# Patient Record
Sex: FEMALE | Race: WHITE | NOT HISPANIC OR LATINO | Employment: OTHER | URBAN - METROPOLITAN AREA
[De-identification: names, ages, dates, MRNs, and addresses within clinical notes are randomized per-mention and may not be internally consistent; named-entity substitution may affect disease eponyms.]

---

## 2024-02-22 ENCOUNTER — OFFICE VISIT (OUTPATIENT)
Dept: NEUROSURGERY | Facility: CLINIC | Age: 74
End: 2024-02-22
Payer: COMMERCIAL

## 2024-02-22 VITALS
TEMPERATURE: 95.7 F | BODY MASS INDEX: 37.76 KG/M2 | HEIGHT: 61 IN | HEART RATE: 81 BPM | WEIGHT: 200 LBS | SYSTOLIC BLOOD PRESSURE: 130 MMHG | DIASTOLIC BLOOD PRESSURE: 72 MMHG

## 2024-02-22 DIAGNOSIS — G89.4 CHRONIC PAIN SYNDROME: ICD-10-CM

## 2024-02-22 DIAGNOSIS — G95.89 MYELOMALACIA OF CERVICAL CORD (MULTI): Primary | ICD-10-CM

## 2024-02-22 PROCEDURE — 1125F AMNT PAIN NOTED PAIN PRSNT: CPT | Performed by: NEUROLOGICAL SURGERY

## 2024-02-22 PROCEDURE — 1159F MED LIST DOCD IN RCRD: CPT | Performed by: NEUROLOGICAL SURGERY

## 2024-02-22 PROCEDURE — 1036F TOBACCO NON-USER: CPT | Performed by: NEUROLOGICAL SURGERY

## 2024-02-22 PROCEDURE — 99215 OFFICE O/P EST HI 40 MIN: CPT | Performed by: NEUROLOGICAL SURGERY

## 2024-02-22 RX ORDER — VIT C/E/ZN/COPPR/LUTEIN/ZEAXAN 250MG-90MG
6000 CAPSULE ORAL
COMMUNITY

## 2024-02-22 RX ORDER — PHENYLEPHRINE HCL 10 MG
1000 TABLET ORAL
COMMUNITY
Start: 2014-10-21

## 2024-02-22 RX ORDER — ACETAMINOPHEN 500 MG
TABLET ORAL EVERY 6 HOURS
COMMUNITY

## 2024-02-22 RX ORDER — LISINOPRIL AND HYDROCHLOROTHIAZIDE 12.5; 2 MG/1; MG/1
1 TABLET ORAL
COMMUNITY
Start: 2023-08-22 | End: 2024-08-21

## 2024-02-22 RX ORDER — MULTIVITAMIN
1 TABLET ORAL
COMMUNITY

## 2024-02-22 RX ORDER — FERROUS SULFATE TAB 325 MG (65 MG ELEMENTAL FE) 325 (65 FE) MG
1 TAB ORAL DAILY
COMMUNITY
Start: 2023-08-05 | End: 2023-09-04

## 2024-02-22 RX ORDER — TRAZODONE HYDROCHLORIDE 150 MG/1
150 TABLET ORAL
COMMUNITY
Start: 2024-01-08

## 2024-02-22 RX ORDER — GABAPENTIN 300 MG/1
300 CAPSULE ORAL 3 TIMES DAILY
COMMUNITY
End: 2024-04-17 | Stop reason: SDUPTHER

## 2024-02-22 RX ORDER — ALBUTEROL SULFATE 90 UG/1
AEROSOL, METERED RESPIRATORY (INHALATION)
COMMUNITY
Start: 2014-10-21

## 2024-02-22 ASSESSMENT — PAIN SCALES - GENERAL: PAINLEVEL: 8

## 2024-02-22 ASSESSMENT — PATIENT HEALTH QUESTIONNAIRE - PHQ9
2. FEELING DOWN, DEPRESSED OR HOPELESS: NOT AT ALL
1. LITTLE INTEREST OR PLEASURE IN DOING THINGS: NOT AT ALL
SUM OF ALL RESPONSES TO PHQ9 QUESTIONS 1 AND 2: 0

## 2024-02-22 ASSESSMENT — ENCOUNTER SYMPTOMS: OCCASIONAL FEELINGS OF UNSTEADINESS: 1

## 2024-02-22 NOTE — PROGRESS NOTES
[facility] [place]  Department of Neurological Surgery  New Patient Visit    History of Present Illness:  Milla Richardson is a 73 y.o. year old female who presents to the spine clinic with severe pain in her neck and down both arms into the thumb and index finger worse on the left than the right.  The symptoms have been present since before her cervical operation 8 years ago.  She had her first operation at the [facility].  She did not improve.  She was seen by my former partner Dr. Bai at [facility].  He asked me to help with the revision decompression.  He has had a new MRI.  Cannot tell if she is completely fused but I think the canal is decompressed.  I still see evidence of myelomalacia at C4-5.  Might take on the situation is that the patient injured her spinal cord and developed a pain syndrome.  She did not improve with the first operation but seems to have done a good job decompressing at the affected level.  She did not improve with the second operation which did decompress the worst level which was C6-7 at the time.  I am concerned that this is not going to improve with any further operations.  I told her I be more than happy to order a CAT scan of the area but I would wait until we were considering more surgery.  My primary recommendation is to see a physical medicine and rehabilitation specialist who might have a few pain management techniques but also can help with the overall management of the disabling condition.    Prior Spine Surgeries: The first surgery was C4-5, C5-6 and C6-7 interbody fusion and fixation; the second operation was takedown of the original plate reoperation at C6-7 and then a new anterior cervical disc excision with interbody fusion and fixation at C3-4 with a new plate construct from C3-C7.  It does look like she is fusing up.  The CAT scan would make that determination for sure.  The fact that the patient has never improved from either  operation and I can still see the myelomalacia being present bodes poorly for more surgery    Physical Therapy: Yes in the past  Diabetic: No  Osteoporosis: Unknown  Patient's BMI is Body mass index is 37.79 kg/m².    14/14 systems reviewed and negative other than what is listed in the history of present illness    Patient Active Problem List   Diagnosis    Myelomalacia of cervical cord (CMS/HCC)    Chronic pain syndrome     No past medical history on file.  No past surgical history on file.  Social History     Tobacco Use    Smoking status: Never    Smokeless tobacco: Never   Substance Use Topics    Alcohol use: Yes     Comment: Socially     family history is not on file.    Current Outpatient Medications:     acetaminophen (Tylenol) 500 mg tablet, every 6 hours., Disp: , Rfl:     albuterol 90 mcg/actuation inhaler, , Disp: , Rfl:     CALCIUM ORAL, Take 1 tablet by mouth once daily., Disp: , Rfl:     cholecalciferol (Vitamin D-3) 25 MCG (1000 UT) capsule, Take 6 capsules (150 mcg) by mouth once daily., Disp: , Rfl:     Cinnamon 500 mg capsule, Take 1,000 Units by mouth once daily., Disp: , Rfl:     gabapentin (Neurontin) 300 mg capsule, Take 1 capsule (300 mg) by mouth 3 times a day., Disp: , Rfl:     lisinopriL-hydrochlorothiazide 20-12.5 mg tablet, Take 1 tablet by mouth once daily., Disp: , Rfl:     multivitamin tablet, Take 1 tablet by mouth once daily., Disp: , Rfl:     traZODone (Desyrel) 150 mg tablet, Take 1 tablet (150 mg) by mouth., Disp: , Rfl:   Allergies   Allergen Reactions    Penicillins Itching and Rash       Physical Examination      General: NAD, she is very emotional today, AOx 3,  no aphasia or dysarthria, normal fund of knowledge  Cranial Nerves II-XII: VFF, PERRL, EOMI, Face Symm, Facial SILT, Palate/Tongue midline and symmetric  Motor: 5/5 Throughout all extremities,  No drift, no dysmetria on finger to nose  Sensation: SILT and PP throughout all extremities  DTRS: 2+ Throughout, No Hoffmans  or Clonus    Results    I personally reviewed and interpreted the imaging results which included the MRI of her cervical spine which is as described above.    Assessment and Plan:    Milla Richardson is a 73 y.o. year old female who presents to the spine clinic with the chronic pain syndrome with evidence of myelomalacia.  No improvement of her symptoms after 2 separate cervical operations.  I think she should get enrolled with a physical medicine and rehabilitation specialist to try to maximize her function.  I told her that I be happy to order a CAT scan and talk about the situation further but I am trying to discourage her from thinking there is anything that will make her better that is surgical..      I have reviewed all prior documentation and reviewed the electronic medical record since admission. I have personally have reviewed all advanced imaging not just the reports and used my interpretation as documented as the relevant findings. I have reviewed the risks and benefits of all treatment recommendations listed in this note with the patient and family. I spent a total of 60 minutes in service to this patient's care during this date of service.      The above clinical summary has been dictated with voice recognition software. It has not been proofread for grammatical errors, typographical mistakes, or other semantic inconsistencies.    Thank you for visiting our office today. It was our pleasure to take part in your healthcare.     Do not hesitate to call with any questions regarding your plan of care after leaving. My office can be reached at [phone] M-Q 8am-4pm.     To clinicians, thank you very much for this kind referral. It is a privilege to partner with you in the care of your patients. My office would be delighted to assist you with any further consultations or with questions regarding the plan of care outlined. Do not hesitate to call the office or contact me directly.     Sincerely,    Ben  KAYLIN Smith MD, FAANS, FACS  Board Certified Neurological Surgeon  , Department of Neurological Surgery  [facility]    [facility]  [address]  Medical [facility] [address]  [address]  [address]  [address]  [place] [address]    Phone: [phone]  Fax: [phone]

## 2024-03-07 ENCOUNTER — CONSULT (OUTPATIENT)
Dept: ORTHOPEDIC SURGERY | Facility: CLINIC | Age: 74
End: 2024-03-07
Payer: COMMERCIAL

## 2024-03-07 DIAGNOSIS — M54.12 CERVICAL RADICULITIS: Primary | ICD-10-CM

## 2024-03-07 DIAGNOSIS — G56.02 CARPAL TUNNEL SYNDROME, LEFT: ICD-10-CM

## 2024-03-07 DIAGNOSIS — M79.18 MYOFASCIAL PAIN: ICD-10-CM

## 2024-03-07 DIAGNOSIS — G95.89 MYELOMALACIA OF CERVICAL CORD (MULTI): ICD-10-CM

## 2024-03-07 DIAGNOSIS — G58.7 DOUBLE CRUSH SYNDROME: ICD-10-CM

## 2024-03-07 PROCEDURE — 99204 OFFICE O/P NEW MOD 45 MIN: CPT | Performed by: PHYSICAL MEDICINE & REHABILITATION

## 2024-03-07 RX ORDER — DULOXETIN HYDROCHLORIDE 20 MG/1
CAPSULE, DELAYED RELEASE ORAL
Qty: 90 CAPSULE | Refills: 2 | Status: SHIPPED | OUTPATIENT
Start: 2024-03-07

## 2024-03-07 NOTE — PROGRESS NOTES
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  Status post C3-7 revision fusion surgery with residual left C4 and bilateral C5 foraminal stenosis  Also status post bilateral carpal tunnel syndrome in the 1980s with revision in 2015 on the left  EMG suggested left ulnar neuropathy but neuromuscular ultrasound from [facility] shows bilateral median nerve enlargement and borderline left/significant right ulnar nerve enlargement at the elbow.    RECOMMENDATIONS:  -Personally interpreted MRI cervical images from [facility] with report, agree per above  -Extensive record review of [facility] including ultrasound report per above  -Increase gabapentin to 900 mg 3 times daily  -Start duloxetine 20 mg twice daily  -Will need to discuss anti-inflammatories, possibly topical diclofenac or compounded formulations at follow-up  -f/u with Dr. Gaby Marquis D.O. and myself for ultrasound-guided left carpal tunnel injection.  Her previous injections were not ultrasound-guided and she says they were done by an assistant and Dr. Barragan's office.  If she does get even temporary relief with the carpal tunnel injection I will refer her to one of our hand surgeons here at her request  -If she does not get significant relief with carpal tunnel injection we will probably have Dr. Mccracken do diagnostic left C4 and C6 selective nerve root blocks  -She also needs to try therapy again for her cervical spine, but I would prefer to get her a little better pain control and narrow down the diagnosis first    Diagnoses and plan discussed with the patient, patient educated on above diagnoses and treatments, including alternatives     Renato Redding MD, FAAPMR, R-MSK  Chief, Division of PM&R  Board Certified in PM&R and Sports Medicine    [place] copy : JEFFERSON WATSON  "only  ____________________________________________________________________    3/7/2024    CC: Patient complains of neck pain and left greater than right upper extremity pain that is when he finished the    HPI:    Seen at the request of Dr. Ben Smith from neurosurgery for chronic neck and left greater than right upper extremity pain.   Has h/o b/l total knee arthroplasty and Lumbar Laminectomy 2020 ([facility]), Carpal tunnel surgery in 1980s followed by revision CT release 2015.   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  Had concussion and neck pain , then developed Left UE coldness and  eventually pain shooting more to Left UE.   I summarized the history from Dr. Smith from earlier this month as follows and confirmed the patient:    \"severe pain in her neck and down both arms into the thumb and index finger worse on the left than the right.  The symptoms have been present since before her cervical operation 8 years ago.  She had her first operation at the [facility].  She did not improve.  She was seen by my former partner Dr. Bai at [facility].  He asked me to help with the revision decompression.  He has had a new MRI.  Cannot tell if she is completely fused but I think the canal is decompressed.  I still see evidence of myelomalacia at C4-5.  Might take on the situation is that the patient injured her spinal cord and developed a pain syndrome.  She did not improve with the first operation but seems to have done a good job decompressing at the affected level.  She did not improve with the second operation which did decompress the worst level which was C6-7 at the time.  I am concerned that this is not going to improve with any further operations.  I told her I be more than happy to order a CAT scan of the area but I would wait until we were considering more surgery.  My primary recommendation is to see a physical medicine and rehabilitation specialist who might " "have a few pain management techniques but also can help with the overall management of the disabling condition.\"     Prior Spine Surgeries: The first surgery was C4-5, C5-6 and C6-7 interbody fusion and fixation at [facility] 2020; the second operation was June 2021 but \"failed\" pt and spinal cord was \"shutting down\"  Then had takedown of the original plate reoperation at C6-7 and then a new anterior cervical disc excision with interbody fusion and fixation at C3-4 with a new plate construct from C3-C7 at [facility] Dec 2021 with Dr Smith.  Per Dr Smith It does look like she is fusing up.  The CAT scan would make that determination for sure.  The fact that the patient has never improved from either operation and I can still see the myelomalacia being present bodes poorly for more surgery     Physical Therapy: Yes in the past  Diabetic: No  Osteoporosis: Unknown  Patient's BMI is Body mass index is 37.79 kg/m².   .    The pain increases with and is relieved by.    Pain is severe, about 8 out of 10 at most severe and 6 out of 10 on average.  She describes a cold sensation throughout most of her left hand, wears a glove most of the day.  Pain and paresthesias are worse in the thumb, index and long finger, but different than what she had for carpal tunnel in years past which was her whole hand.  Pain is worse with activity, gets a zinging feeling in left side of her neck with rotation but just briefly, better at rest.    Mood is okay.  She takes trazodone 150 mg each evening that helps with sleep and she does not awaken with pain         Patient reports no fevers, chills, sweats, night pain, weight loss or cancer history.    Pertinent Physical Exam:  MSK: Cervical Spine: ROM moderately reduced in all planes but more limited in left lateral bending and rotation with reproduction of zinging sensation just to the left shoulder blade, Posture moderately kyphotic, Tender minimally paraspinals left upper trap and levator, " Spurling mildly positive to left more upper cervical distribution  Neuro: Normal Sensation, strength, bulk and tone of upper and except left greater than right hand intrinsic weakness, and some pain related guarding but no true underlying weakness.  She is a very strong woman.  Normal strength of lower limbs bilaterally, reflexes are 3+ brisk in the upper extremities and 2+ in lower extremities, positive Naida on the left no clonus  Bilateral wrist exam range of motion mildly reduced, positive Tinel at left median nerve at the wrist, including paresthesias to the palm.  Mild thenar weakness left greater than right may be pain related    SUPPORTING DOCUMENTATION (remaining history, exam, other findings):  Work-up reviewed - this has included  MRI cervical spine at [facility] 12/14/2023 per above, neuromuscular ultrasound at [facility] February 23, 2024 also per above    Treatment has included  - cervical BRIGID Dr Cho at [facility] - Oct 22- no help at all.    She required neck brace and bone stimulator for a few months after her latest cervical surgery and never mated to physical therapy  Gabapentin 600 mg 3 times daily.  Previously tried pregabalin but was sedating, was concerned that she overdosed herself      See above for Assessment and Plan

## 2024-03-19 NOTE — PROGRESS NOTES
"Assessment     Milla is a 73 y.o. female, who presents with Neck and left upper extremity pain  .  The patient's symptoms, clinical exam and imaging studies are suggestive of double crush syndrome complicated, cervical radiculitis, carpal tunnel syndrome     Her symptoms have stayed the same since the last visit.  Gabapentin, duloxetine    equivocal.  .      Surgery: Hx:  Date/Type/Location/%relief/ Lasting  - Status post C3-7 revision fusion surgery with residual left C4 and bilateral C5 foraminal stenosis   - status post bilateral carpal tunnel syndrome in the 1980s with revision in 2015   - h/o b/l total knee arthroplasty and Lumbar Laminectomy 2020 ([facility])     Dr. Smith's note:  The first surgery was C4-5, C5-6 and C6-7 interbody fusion and fixation at [facility] 2020; the second operation was June 2021 but \"failed\" pt and spinal cord was \"shutting down\"  Then had takedown of the original plate reoperation at C6-7 and then a new anterior cervical disc excision with interbody fusion and fixation at C3-4 with a new plate construct from C3-C7 at [facility] Dec 2021 with Dr Smith.  Per Dr Smith It does look like she is fusing up.  The CAT scan would make that determination for sure.  The fact that the patient has never improved from either operation and I can still see the myelomalacia being present bodes poorly for more surgery     Plan     At this time, I would like to make the following recommendation/plan:  1.  Physical Therapy:  PT referral last visit   2.  Medication: gabapentin 900 mg 3 times daily, duloxetine 20 mg twice daily  -Will need to discuss anti-inflammatories, possibly topical diclofenac or compounded formulations at follow-up    3.  Injections: ultrasound-guided left carpal tunnel injection.   Possible diagnostic left C4 and C6 selective nerve root blocks   4.  Imaging: Interpreted: U/S see procedure note below.   5.  EMG suggested left ulnar neuropathy but neuromuscular ultrasound from [place] " clinic shows bilateral median nerve enlargement and borderline left/significant right ulnar nerve enlargement at the elbow.     Follow up:  Follow up in 4-6 weeks       Subjective    Chief Complaint: neck pain and left greater than right upper extremity pain     HPI:  Milla Richardson is a 73 y.o. female, with pertinent PMH of double crush syndrome, cervical radiculitis, carpal tunnel s/p release x2 who is following up today for radicular cervical pain  started >8 years ago prior to her cervical surgery.  She was last seen on 03/07/24. Plan at the time was increase gabapentin.  Since her last visit symptoms have stayed the same . Today, pain is located severe pain in her neck and down both arms into the thumb and index finger worse on the left than the right, described as burning sharpradiating, 6-8/10.        ROS:   12-point review of systems was completed and is otherwise negative except as noted in the HPI.      Objective     Physical Exam  General:  Appears state age, in NAD, and well nourished  Psychological:  Normal mood and affect  Pulm:  Breathing comfortably on RA  Moving all extremities against gravity  Able to ambulate without assistive device     Imaging and Other Studies:    EXAM: MR CERVICAL SPINE W AND WO CONTRAST     History: Abnormal EMG. Arm numbness.     Technique: Multiplanar multisequence MRI of the cervical spine was performed without and with contrast.     Comparison:     Findings:     Craniocervical junction is within normal limits. No cervical cord signal abnormality is identified.     No aggressive bone marrow signal abnormality.  Cervical spine alignment is within normal limits.     Postsurgical changes of anterior cervical spine fusion at C3-C7 with associated susceptibility artifact. C2-3 and C7-T1 intervertebral disc heights are maintained.   C2-C3: Minimal disc bulge. No neural foraminal or spinal canal stenosis.     C3-C4: Mild left uncovertebral hypertrophy and facet arthropathy.  Moderate left neural foraminal stenosis. No spinal canal stenosis.     C4-C5: Posterior endplate spurring. Mild facet arthropathy. Mild right and moderate left neural foraminal stenosis. Mild spinal canal stenosis.     C5-C6: Posterior endplate spurring. Mild bilateral uncovertebral hypertrophy. Mild bilateral neural foraminal stenosis. Mild spinal canal stenosis.     C6-C7: Posterior endplate spurring. Mild uncovertebral hypertrophy. Moderate bilateral neural foraminal stenosis. Mild spinal canal stenosis.     C7-T1: No significant disc bulge, spinal canal or neuroforaminal stenosis.    US Guided median nerve  Injection:    Indication: CTS b/l     Procedure: Sonographically guided left median nerve corticosteroid injection    Informed Consent: Following denial of allergy and review of potential side effects and complications including, but not limited to, infection, allergic reaction, local tissue breakdown, systemic effects of corticosteroids, elevation of blood glucose, injury to soft tissue and/or nerves, the patient indicated understanding and agreed to proceed.    Technique:  Pre-procedural scanning was performed to determine optimal needle approach for the procedure. The patient was prepped in the usual sterile fashion. Live sonographic guidance was used throughout the procedure. A 25 gauge needle was inserted into the superior aspect of the joint from a lateral prepatellar approach. 1ml 1% lidocaine and 0.5 ml of kenalog.     Post-Procedure Instructions: The patient tolerated the procedure well without complication and was discharged in good condition after a short observation period. The patient was instructed to avoid submerging the procedure site in water for 48-72 hours. The patient was instructed to contact me with any questions pertaining to the procedure and to inform me of the results of the procedure in approximately 7-10 days, as needed. Questions regarding general management should be directed to  the patient's referring provider and the patient should keep all previously scheduled follow up appointments.    Multiple key images were saved.     Hand / UE Inj/Asp: L carpal tunnel for carpal tunnel syndrome on 3/23/2024 6:34 PM  Indications: pain and tendon swelling  Details: 18 G needle, ultrasound-guided volar approach  Medications: 1 mL lidocaine PF 10 mg/mL (1 %); 20 mg triamcinolone acetonide 40 mg/mL  Outcome: tolerated well, no immediate complications  Procedure, treatment alternatives, risks and benefits explained, specific risks discussed. Consent was given by the patient.

## 2024-03-20 ENCOUNTER — OFFICE VISIT (OUTPATIENT)
Dept: ORTHOPEDIC SURGERY | Facility: CLINIC | Age: 74
End: 2024-03-20
Payer: COMMERCIAL

## 2024-03-20 DIAGNOSIS — G56.02 CARPAL TUNNEL SYNDROME, LEFT: ICD-10-CM

## 2024-03-20 DIAGNOSIS — G95.89 MYELOMALACIA OF CERVICAL CORD (MULTI): ICD-10-CM

## 2024-03-20 PROCEDURE — 1159F MED LIST DOCD IN RCRD: CPT | Performed by: STUDENT IN AN ORGANIZED HEALTH CARE EDUCATION/TRAINING PROGRAM

## 2024-03-20 PROCEDURE — 99213 OFFICE O/P EST LOW 20 MIN: CPT | Performed by: STUDENT IN AN ORGANIZED HEALTH CARE EDUCATION/TRAINING PROGRAM

## 2024-03-20 PROCEDURE — 1036F TOBACCO NON-USER: CPT | Performed by: STUDENT IN AN ORGANIZED HEALTH CARE EDUCATION/TRAINING PROGRAM

## 2024-03-23 PROCEDURE — 20526 THER INJECTION CARP TUNNEL: CPT | Performed by: STUDENT IN AN ORGANIZED HEALTH CARE EDUCATION/TRAINING PROGRAM

## 2024-03-23 PROCEDURE — 76942 ECHO GUIDE FOR BIOPSY: CPT | Performed by: STUDENT IN AN ORGANIZED HEALTH CARE EDUCATION/TRAINING PROGRAM

## 2024-03-23 RX ORDER — TRIAMCINOLONE ACETONIDE 40 MG/ML
20 INJECTION, SUSPENSION INTRA-ARTICULAR; INTRAMUSCULAR
Status: COMPLETED | OUTPATIENT
Start: 2024-03-23 | End: 2024-03-23

## 2024-03-23 RX ORDER — LIDOCAINE HYDROCHLORIDE 10 MG/ML
1 INJECTION, SOLUTION EPIDURAL; INFILTRATION; INTRACAUDAL; PERINEURAL
Status: COMPLETED | OUTPATIENT
Start: 2024-03-23 | End: 2024-03-23

## 2024-03-23 RX ADMIN — LIDOCAINE HYDROCHLORIDE 1 ML: 10 INJECTION, SOLUTION EPIDURAL; INFILTRATION; INTRACAUDAL; PERINEURAL at 18:34

## 2024-03-23 RX ADMIN — TRIAMCINOLONE ACETONIDE 20 MG: 40 INJECTION, SUSPENSION INTRA-ARTICULAR; INTRAMUSCULAR at 18:34

## 2024-04-17 ENCOUNTER — OFFICE VISIT (OUTPATIENT)
Dept: ORTHOPEDIC SURGERY | Facility: CLINIC | Age: 74
End: 2024-04-17
Payer: COMMERCIAL

## 2024-04-17 DIAGNOSIS — M54.12 CERVICAL RADICULITIS: Primary | ICD-10-CM

## 2024-04-17 PROCEDURE — 99214 OFFICE O/P EST MOD 30 MIN: CPT | Performed by: STUDENT IN AN ORGANIZED HEALTH CARE EDUCATION/TRAINING PROGRAM

## 2024-04-17 PROCEDURE — 1159F MED LIST DOCD IN RCRD: CPT | Performed by: STUDENT IN AN ORGANIZED HEALTH CARE EDUCATION/TRAINING PROGRAM

## 2024-04-17 RX ORDER — GABAPENTIN 300 MG/1
600 CAPSULE ORAL 3 TIMES DAILY
Qty: 180 CAPSULE | Refills: 2 | Status: SHIPPED | OUTPATIENT
Start: 2024-04-17 | End: 2024-04-29 | Stop reason: SDUPTHER

## 2024-04-17 NOTE — PROGRESS NOTES
"Assessment     Milla is a 73 y.o. female, who presents with Neck and left upper extremity pain  .  The patient's symptoms, clinical exam and imaging studies are suggestive of double crush syndrome complicated, cervical radiculitis, carpal tunnel syndrome. Possible CRPS of left upper extremity.    Her symptoms have stayed the same since the last visit.  Duloxetine helps with myofacial pain. Gabapentin good for nerve pain.     Injection: Hx:  - 10/2022: steroid injection in neck    Surgery: Hx:  Date/Type/Location/%relief/ Lasting  - Status post C3-7 revision fusion surgery with residual left C4 and bilateral C5 foraminal stenosis   - status post bilateral carpal tunnel syndrome in the 1980s with revision in 2015   - h/o b/l total knee arthroplasty and Lumbar Laminectomy 2020 ([facility])     Dr. Smith's note:  The first surgery was C4-5, C5-6 and C6-7 interbody fusion and fixation at [facility] 2020; the second operation was June 2021 but \"failed\" pt and spinal cord was \"shutting down\"  Then had takedown of the original plate reoperation at C6-7 and then a new anterior cervical disc excision with interbody fusion and fixation at C3-4 with a new plate construct from C3-C7 at [facility] Dec 2021 with Dr Smtih.  Per Dr Smith It does look like she is fusing up.  The CAT scan would make that determination for sure.  The fact that the patient has never improved from either operation and I can still see the myelomalacia being present bodes poorly for more surgery     Plan     At this time, I would like to make the following recommendation/plan:  1.  Physical Therapy:  PT referral last visit   2.  Medication: gabapentin 600 mg 3 times daily, duloxetine 20 mg 1x AM and 2 at night  -Will need to discuss anti-inflammatories, possibly topical diclofenac or compounded formulations at follow-up    3.  Injections: ultrasound-guided left carpal tunnel injection.   Possible diagnostic left C4 and C6 selective nerve root blocks; patient " is not currently interested in an intervention   4.  Imaging: Interpreted: U/S see procedure note below.   5.  EMG suggested left ulnar neuropathy but neuromuscular ultrasound from [facility] shows bilateral median nerve enlargement and borderline left/significant right ulnar nerve enlargement at the elbow.   An EMG from 2021 mild to moderate C5-C7 cervical radicular changes. It did not suggest any carpal tunnel or cubital tunnel changes.   6. Consider referral for CRPS/chronic pain management/Pain psych      Follow up:  Follow up in 4 weeks telephone visit; to discuss how she's like to proceed and try taking gabapentin consistently       Subjective    Chief Complaint: neck pain and left greater than right upper extremity pain     HPI:  Milla Richardson is a 73 y.o. female, with pertinent PMH of double crush syndrome, cervical radiculitis, carpal tunnel s/p release x2 who is following up today for radicular cervical pain  started >8 years ago prior to her cervical surgery.  She was last seen on 03/07/24. Plan at the time was increase gabapentin.  Since her last visit symptoms have stayed the same . Today, pain is located severe pain in her neck and down both arms into the thumb and index finger worse on the left than the right, described as burning sharpradiating, 6-8/10.      Update:  - generally the patient appears to be overwhelmed by her pain and is not optimistic about her treatment options.   ROS:   12-point review of systems was completed and is otherwise negative except as noted in the HPI.      Objective     Physical Exam  General:  Appears state age, in NAD, and well nourished  Psychological:  Normal mood and affect  Pulm:  Breathing comfortably on RA  Moving all extremities against gravity  Able to ambulate without assistive device     Imaging and Other Studies:    EXAM: MR CERVICAL SPINE W AND WO CONTRAST     History: Abnormal EMG. Arm numbness.     Technique: Multiplanar multisequence MRI of the  cervical spine was performed without and with contrast.     Comparison:     Findings:     Craniocervical junction is within normal limits. No cervical cord signal abnormality is identified.     No aggressive bone marrow signal abnormality.  Cervical spine alignment is within normal limits.     Postsurgical changes of anterior cervical spine fusion at C3-C7 with associated susceptibility artifact. C2-3 and C7-T1 intervertebral disc heights are maintained.   C2-C3: Minimal disc bulge. No neural foraminal or spinal canal stenosis.     C3-C4: Mild left uncovertebral hypertrophy and facet arthropathy. Moderate left neural foraminal stenosis. No spinal canal stenosis.     C4-C5: Posterior endplate spurring. Mild facet arthropathy. Mild right and moderate left neural foraminal stenosis. Mild spinal canal stenosis.     C5-C6: Posterior endplate spurring. Mild bilateral uncovertebral hypertrophy. Mild bilateral neural foraminal stenosis. Mild spinal canal stenosis.     C6-C7: Posterior endplate spurring. Mild uncovertebral hypertrophy. Moderate bilateral neural foraminal stenosis. Mild spinal canal stenosis.     C7-T1: No significant disc bulge, spinal canal or neuroforaminal stenosis.

## 2024-04-23 DIAGNOSIS — G95.89 MYELOMALACIA OF CERVICAL CORD (MULTI): Primary | ICD-10-CM

## 2024-04-24 ENCOUNTER — APPOINTMENT (OUTPATIENT)
Dept: ORTHOPEDIC SURGERY | Facility: CLINIC | Age: 74
End: 2024-04-24
Payer: COMMERCIAL

## 2024-04-29 DIAGNOSIS — M54.12 CERVICAL RADICULITIS: ICD-10-CM

## 2024-04-29 RX ORDER — GABAPENTIN 300 MG/1
600 CAPSULE ORAL 3 TIMES DAILY
Qty: 180 CAPSULE | Refills: 2 | Status: SHIPPED | OUTPATIENT
Start: 2024-04-29 | End: 2024-07-28

## 2024-05-22 ENCOUNTER — APPOINTMENT (OUTPATIENT)
Dept: ORTHOPEDIC SURGERY | Facility: CLINIC | Age: 74
End: 2024-05-22
Payer: COMMERCIAL

## 2024-05-22 ENCOUNTER — APPOINTMENT (OUTPATIENT)
Dept: NEUROSURGERY | Facility: CLINIC | Age: 74
End: 2024-05-22
Payer: COMMERCIAL

## 2024-05-22 ENCOUNTER — OFFICE VISIT (OUTPATIENT)
Dept: NEUROSURGERY | Facility: CLINIC | Age: 74
End: 2024-05-22
Payer: COMMERCIAL

## 2024-05-22 VITALS
DIASTOLIC BLOOD PRESSURE: 80 MMHG | WEIGHT: 193 LBS | SYSTOLIC BLOOD PRESSURE: 114 MMHG | HEIGHT: 61 IN | TEMPERATURE: 97.3 F | HEART RATE: 82 BPM | BODY MASS INDEX: 36.44 KG/M2

## 2024-05-22 DIAGNOSIS — G89.4 CHRONIC PAIN SYNDROME: ICD-10-CM

## 2024-05-22 DIAGNOSIS — G95.89 MYELOMALACIA OF CERVICAL CORD (MULTI): Primary | ICD-10-CM

## 2024-05-22 PROCEDURE — 1036F TOBACCO NON-USER: CPT | Performed by: NEUROLOGICAL SURGERY

## 2024-05-22 PROCEDURE — 1159F MED LIST DOCD IN RCRD: CPT | Performed by: NEUROLOGICAL SURGERY

## 2024-05-22 PROCEDURE — 99213 OFFICE O/P EST LOW 20 MIN: CPT | Performed by: NEUROLOGICAL SURGERY

## 2024-05-22 PROCEDURE — 1125F AMNT PAIN NOTED PAIN PRSNT: CPT | Performed by: NEUROLOGICAL SURGERY

## 2024-05-22 ASSESSMENT — ENCOUNTER SYMPTOMS: OCCASIONAL FEELINGS OF UNSTEADINESS: 0

## 2024-05-22 ASSESSMENT — PAIN SCALES - GENERAL: PAINLEVEL: 9

## 2024-05-22 NOTE — PROGRESS NOTES
[facility] [place]  Department of Neurological Surgery  Established Patient Visit    History of Present Illness  Milla Richardson is a 73 y.o. year old female who presents to the spine clinic in follow up with the new CAT scan of her cervical spine.  At her last visit we had the MRI from December 2023 that showed that there was persistent myelomalacia in the spinal cord.  The canal was decompressed especially on the most symptomatic side which was the left side.  The CAT scan confirms that there is foraminal stenosis on the right side at C6-7 but the left side is wide open.  I mentioned at our last visit that I thought that not improving from the first operation and not improving from the second operation is a pretty good indication that further surgery would not be beneficial.  I did want to check the CAT scan to see if there was something that I was missing.  Offering a foraminotomies on the right side at C6-7 will not change the excruciating left-sided pain.  At this point in time I did I am not and would not recommend any further surgery.  I think it is reasonable for her to continue to work with pain management and physical medicine and rehabilitation.  She is frustrated with me and she is frustrated with my office staff.  I think more importantly she is dealing with a chronic pain syndrome and it is debilitating and I am hoping that pain management or physical medicine will be able to help her.    Patient's BMI is Body mass index is 36.47 kg/m².    14/14 systems reviewed and negative other than what is listed in the history of present illness    Patient Active Problem List   Diagnosis    Myelomalacia of cervical cord (Multi)    Chronic pain syndrome    Cervical radiculitis    Myofascial pain    Double crush syndrome    Carpal tunnel syndrome, left     No past medical history on file.  No past surgical history on file.  Social History     Tobacco Use    Smoking status: Never    Smokeless  tobacco: Never   Substance Use Topics    Alcohol use: Yes     Comment: Socially     family history is not on file.    Current Outpatient Medications:     acetaminophen (Tylenol) 500 mg tablet, every 6 hours., Disp: , Rfl:     albuterol 90 mcg/actuation inhaler, , Disp: , Rfl:     CALCIUM ORAL, Take 1 tablet by mouth once daily., Disp: , Rfl:     cholecalciferol (Vitamin D-3) 25 MCG (1000 UT) capsule, Take 6 capsules (150 mcg) by mouth once daily., Disp: , Rfl:     Cinnamon 500 mg capsule, Take 1,000 Units by mouth once daily., Disp: , Rfl:     DULoxetine (Cymbalta) 20 mg DR capsule, 1 capsule each evening for 7 days then 1 capsule twice daily for 7 days then 1 capsule in morning and 2 capsule in evening, Disp: 90 capsule, Rfl: 2    gabapentin (Neurontin) 300 mg capsule, Take 2 capsules (600 mg) by mouth 3 times a day., Disp: 180 capsule, Rfl: 2    lisinopriL-hydrochlorothiazide 20-12.5 mg tablet, Take 1 tablet by mouth once daily., Disp: , Rfl:     multivitamin tablet, Take 1 tablet by mouth once daily., Disp: , Rfl:     traZODone (Desyrel) 150 mg tablet, Take 1 tablet (150 mg) by mouth., Disp: , Rfl:   Allergies   Allergen Reactions    Penicillins Itching and Rash           Results:  I personally reviewed and interpreted the imaging results which included the CT is as I mentioned earlier    Assessment and Plan:      Milla Richardson is a 73 y.o. year old female who presents to the spine clinic in follow up with the new CAT scan of her cervical spine.  At her last visit we had the MRI from December 2023 that showed that there was persistent myelomalacia in the spinal cord.  The canal was decompressed especially on the most symptomatic side which was the left side.  The CAT scan confirms that there is foraminal stenosis on the right side at C6-7 but the left side is wide open.  I mentioned at our last visit that I thought that not improving from the first operation and not improving from the second operation is a  pretty good indication that further surgery would not be beneficial.  I did want to check the CAT scan to see if there was something that I was missing.  Offering a foraminotomies on the right side at C6-7 will not change the excruciating left-sided pain.  At this point in time I did I am not and would not recommend any further surgery.  I think it is reasonable for her to continue to work with pain management and physical medicine and rehabilitation.  She is frustrated with me and she is frustrated with my office staff.  I think more importantly she is dealing with a chronic pain syndrome and it is debilitating and I am hoping that pain management or physical medicine will be able to help her.      I have reviewed all prior documentation and reviewed the electronic medical record since admission. I have personally have reviewed all advanced imaging not just the reports and used my interpretation as documented as the relevant findings. I have reviewed the risks and benefits of all treatment recommendations listed in this note with the patient and family. I spent a total of 25 minutes in service to this patient's care during this date of service.      The above clinical summary has been dictated with voice recognition software. It has not been proofread for grammatical errors, typographical mistakes, or other semantic inconsistencies.    Thank you for visiting our office today. It was our pleasure to take part in your healthcare.     Do not hesitate to call with any questions regarding your plan of care after leaving. My office can be reached at [phone] M-F 8am-4pm.     To clinicians, thank you very much for this kind referral. It is a privilege to partner with you in the care of your patients. My office would be delighted to assist you with any further consultations or with questions regarding the plan of care outlined. Do not hesitate to call the office or contact me directly.     Sincerely,    Ben EWING  MD Luis, FAANS, FACS  Board Certified Neurological Surgeon  , Department of Neurological Surgery  [employer] [facility] [employer]    [facility]  [address]  [facility] [address]  [address]  [address]  [address]  [place] [address]    Phone: [phone]  Fax: [phone]

## 2024-06-12 ENCOUNTER — APPOINTMENT (OUTPATIENT)
Dept: NEUROSURGERY | Facility: CLINIC | Age: 74
End: 2024-06-12
Payer: COMMERCIAL

## 2025-09-17 ENCOUNTER — APPOINTMENT (OUTPATIENT)
Dept: ORTHOPEDIC SURGERY | Facility: CLINIC | Age: 75
End: 2025-09-17
Payer: COMMERCIAL